# Patient Record
Sex: MALE | Race: WHITE | Employment: OTHER | URBAN - METROPOLITAN AREA
[De-identification: names, ages, dates, MRNs, and addresses within clinical notes are randomized per-mention and may not be internally consistent; named-entity substitution may affect disease eponyms.]

---

## 2021-04-08 ENCOUNTER — OFFICE VISIT (OUTPATIENT)
Dept: NEUROLOGY | Age: 81
End: 2021-04-08
Payer: MEDICARE

## 2021-04-08 DIAGNOSIS — R41.3 MEMORY LOSS: Primary | ICD-10-CM

## 2021-04-08 DIAGNOSIS — F41.8 ANXIETY ABOUT HEALTH: ICD-10-CM

## 2021-04-08 PROCEDURE — 90791 PSYCH DIAGNOSTIC EVALUATION: CPT | Performed by: PSYCHOLOGIST
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
## 2021-04-19 ENCOUNTER — OFFICE VISIT (OUTPATIENT)
Dept: NEUROLOGY | Age: 81
End: 2021-04-19
Payer: MEDICARE

## 2021-04-19 DIAGNOSIS — F41.8 ANXIETY ABOUT HEALTH: ICD-10-CM

## 2021-04-19 DIAGNOSIS — F02.80 DEMENTIA FOLLOWING HYPOXIC-ISCHEMIC INJURY (HCC): Primary | ICD-10-CM

## 2021-04-19 DIAGNOSIS — G93.1 DEMENTIA FOLLOWING HYPOXIC-ISCHEMIC INJURY (HCC): Primary | ICD-10-CM

## 2021-04-19 PROCEDURE — 96138 PSYCL/NRPSYC TECH 1ST: CPT | Performed by: PSYCHOLOGIST

## 2021-04-19 PROCEDURE — 96136 PSYCL/NRPSYC TST PHY/QHP 1ST: CPT | Performed by: PSYCHOLOGIST

## 2021-04-19 PROCEDURE — 96137 PSYCL/NRPSYC TST PHY/QHP EA: CPT | Performed by: PSYCHOLOGIST

## 2021-04-19 PROCEDURE — 96133 NRPSYC TST EVAL PHYS/QHP EA: CPT | Performed by: PSYCHOLOGIST

## 2021-04-19 PROCEDURE — 96132 NRPSYC TST EVAL PHYS/QHP 1ST: CPT | Performed by: PSYCHOLOGIST

## 2021-04-19 PROCEDURE — 96139 PSYCL/NRPSYC TST TECH EA: CPT | Performed by: PSYCHOLOGIST
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
## 2021-04-28 ENCOUNTER — OFFICE VISIT (OUTPATIENT)
Dept: NEUROLOGY | Age: 81
End: 2021-04-28
Payer: MEDICARE

## 2021-04-28 DIAGNOSIS — G93.1 DEMENTIA FOLLOWING HYPOXIC-ISCHEMIC INJURY (HCC): Primary | ICD-10-CM

## 2021-04-28 DIAGNOSIS — F02.80 DEMENTIA FOLLOWING HYPOXIC-ISCHEMIC INJURY (HCC): Primary | ICD-10-CM

## 2021-04-28 DIAGNOSIS — F41.8 ANXIETY ABOUT HEALTH: ICD-10-CM

## 2021-04-28 PROCEDURE — 90847 FAMILY PSYTX W/PT 50 MIN: CPT | Performed by: PSYCHOLOGIST

## 2021-04-28 NOTE — PROGRESS NOTES
Interactive Psychotherapy/office feedback        Interactive office feedback session with Mr. Evonne Coyne and his wife. I reviewed the results of the recent Neuropsychological Evaluation  including the observed areas of neurocognitive strengths and weaknesses. Education was provided regarding my diagnostic impressions, and treatment plan/options were discussed. I also answered numerous questions related to the clinical findings, including the various methods to improve cognition and mood. CBT, psychoeducation, and supportive psychotherapy techniques were utilized. Prior to seeing the patient I reviewed the records, including the previously completed report, the records in [facility], and any updated visits from other providers since I saw the patient last.       Diagnoses:      1. Dementia following hypoxic-ischemic injury, moderate  2. Anxiety about health                  The patient will follow up with the referring provider, and reported being very pleased with the services provided. Follow up with [facility], [place]. [id number] psychotherapy with mom. 45 minutes       This note was created using voice recognition software. Despite editing, there may be syntax errors. This note will not be viewable in [employer] for the following reason(s). This is a Psychotherapy Note.  ([address], [facility] Providers Only)

## 2021-06-02 ENCOUNTER — DOCUMENTATION ONLY (OUTPATIENT)
Dept: NEUROLOGY | Age: 81
End: 2021-06-02

## 2021-06-02 NOTE — PROGRESS NOTES
Contacted patient spouse to inform of advisement from Dr. Jordana Bergman in response to letter from Miko Rocio White. She was informed that Dr. Jordana Bergman is unable to change any notations documented as Review of records. Dr. Jordana Bergman would like to assure . Lynseydarius White that it will not change the diagnostic impressions of his testing.